# Patient Record
Sex: FEMALE | Race: WHITE | NOT HISPANIC OR LATINO | URBAN - METROPOLITAN AREA
[De-identification: names, ages, dates, MRNs, and addresses within clinical notes are randomized per-mention and may not be internally consistent; named-entity substitution may affect disease eponyms.]

---

## 2024-12-02 ENCOUNTER — NURSING HOME VISIT (OUTPATIENT)
Dept: POST ACUTE CARE | Facility: EXTERNAL LOCATION | Age: 69
End: 2024-12-02
Payer: MEDICARE

## 2024-12-02 DIAGNOSIS — I10 ESSENTIAL (PRIMARY) HYPERTENSION: Primary | ICD-10-CM

## 2024-12-02 DIAGNOSIS — R53.1 WEAKNESS: ICD-10-CM

## 2024-12-02 DIAGNOSIS — K21.9 GASTROESOPHAGEAL REFLUX DISEASE WITHOUT ESOPHAGITIS: ICD-10-CM

## 2024-12-02 DIAGNOSIS — S72.92XD UNSPECIFIED FRACTURE OF LEFT FEMUR, SUBSEQUENT ENCOUNTER FOR CLOSED FRACTURE WITH ROUTINE HEALING: ICD-10-CM

## 2024-12-02 PROCEDURE — 99305 1ST NF CARE MODERATE MDM 35: CPT | Performed by: INTERNAL MEDICINE

## 2024-12-02 NOTE — LETTER
Patient: Sujata Oliveros  : 1955    Encounter Date: 2024    HISTORY & PHYSICAL    Subjective  Chief complaint: Sujata Oliveros is a 69 y.o. female who is a acute skilled care patient being seen and evaluated for multiple medical problems.  Patient presents for weakness.    HPI:  Patient is a 69-year-old female who presented to the hospital after a fall.  Imaging showed a left proximal femoral diaphysis fracture around previous surgical hardware.  Orthopedic surgery was consulted.  She underwent ORIF and removal of hardware.  She was discharged to a SNF.        No past medical history on file.    No past surgical history on file.    No family history on file.    Social History     Socioeconomic History   • Marital status:        Vital signs:  133/82, 98.9, 83, 16, , 99%    Objective  Physical Exam  Constitutional:       General: She is not in acute distress.  Eyes:      Extraocular Movements: Extraocular movements intact.   Pulmonary:      Effort: Pulmonary effort is normal.   Musculoskeletal:      Cervical back: Neck supple.   Neurological:      Mental Status: She is alert.   Psychiatric:         Mood and Affect: Mood normal.         Behavior: Behavior is cooperative.         Assessment/Plan  Problem List Items Addressed This Visit       Unspecified fracture of left femur, subsequent encounter for closed fracture with routine healing     Status post ORIF  Pain control  PT OT         Essential (primary) hypertension - Primary     Continue current medications         Weakness     PT OT         Gastroesophageal reflux disease without esophagitis     Continue current medications          Hospital records reviewed  Medications, treatments, and labs reviewed  Continue medications and treatments as listed in EMR  Discussed with nursing and therapy    DO Dana Pettit Attestation  I, Chantell Cortez   attest that this documentation has been prepared under the direction and in  the presence of Juanjo Geronimo DO    Provider Attestation - Scribe documentation  All medical record entries made by the Scribe were at my direction and personally dictated by me. I have reviewed the chart and agree that the record accurately reflects my personal performance of the history, physical exam, discussion and plan.      Electronically Signed By: Juanjo Geronimo DO   12/15/24  3:59 PM

## 2024-12-03 ENCOUNTER — NURSING HOME VISIT (OUTPATIENT)
Dept: POST ACUTE CARE | Facility: EXTERNAL LOCATION | Age: 69
End: 2024-12-03
Payer: MEDICARE

## 2024-12-03 DIAGNOSIS — J45.909 UNCOMPLICATED ASTHMA, UNSPECIFIED ASTHMA SEVERITY, UNSPECIFIED WHETHER PERSISTENT (HHS-HCC): ICD-10-CM

## 2024-12-03 DIAGNOSIS — I10 ESSENTIAL (PRIMARY) HYPERTENSION: ICD-10-CM

## 2024-12-03 DIAGNOSIS — S72.92XD UNSPECIFIED FRACTURE OF LEFT FEMUR, SUBSEQUENT ENCOUNTER FOR CLOSED FRACTURE WITH ROUTINE HEALING: ICD-10-CM

## 2024-12-03 DIAGNOSIS — R53.1 WEAKNESS: Primary | ICD-10-CM

## 2024-12-03 PROCEDURE — 99309 SBSQ NF CARE MODERATE MDM 30: CPT | Performed by: REGISTERED NURSE

## 2024-12-03 NOTE — LETTER
Patient: Sujata Oliveros  : 1955    Encounter Date: 2024    PROGRESS NOTE    Subjective  Chief complaint: Sujata Oliveros is a 69 y.o. female who is an acute skilled patient being seen and evaluated for weakness    HPI:  Patient presents for f/u therapy and general medical care. Patient seen and examined at [facility]. Therapy has been working with the patient to improve strength, endurance, ADLs, and transfers d/t generalized weakness. Patient has no acute concerns today.    Objective  Vital signs: 112/56, 98.8, 86, 16, 100%    Physical Exam  Constitutional:       General: She is not in acute distress.  Eyes:      Extraocular Movements: Extraocular movements intact.   Pulmonary:      Effort: Pulmonary effort is normal.   Musculoskeletal:      Cervical back: Neck supple.   Neurological:      Mental Status: She is alert.   Psychiatric:         Mood and Affect: Mood normal.         Behavior: Behavior is cooperative.         Assessment/Plan  Problem List Items Addressed This Visit       Unspecified fracture of left femur, subsequent encounter for closed fracture with routine healing     Pain mgmt   Follow up ortho  Therapy            Essential (primary) hypertension     Continue current medications          Weakness - Primary     Pt/ot   Encourage pt to ask for assistance          Unspecified asthma, uncomplicated (HHS-HCC)     Continue current medications           Medications, treatments, and labs reviewed  Continue medications and treatments as listed in [facility]    Scribe Attestation  I, Dana Sosa   attest that this documentation has been prepared under the direction and in the presence of PIPO Wolff.    Provider Attestation - Scribe documentation  All medical record entries made by the Scribe were at my direction and personally dictated by me. I have reviewed the chart and agree that the record accurately reflects my personal performance of the history, physical exam,  discussion and plan.    PIPO Wolff        Electronically Signed By: PIPO Wolff   12/10/24  4:08 PM

## 2024-12-04 ENCOUNTER — NURSING HOME VISIT (OUTPATIENT)
Dept: POST ACUTE CARE | Facility: EXTERNAL LOCATION | Age: 69
End: 2024-12-04
Payer: MEDICARE

## 2024-12-04 DIAGNOSIS — I10 ESSENTIAL (PRIMARY) HYPERTENSION: ICD-10-CM

## 2024-12-04 DIAGNOSIS — R53.1 WEAKNESS: ICD-10-CM

## 2024-12-04 DIAGNOSIS — S72.92XD UNSPECIFIED FRACTURE OF LEFT FEMUR, SUBSEQUENT ENCOUNTER FOR CLOSED FRACTURE WITH ROUTINE HEALING: Primary | ICD-10-CM

## 2024-12-04 DIAGNOSIS — J45.909 UNCOMPLICATED ASTHMA, UNSPECIFIED ASTHMA SEVERITY, UNSPECIFIED WHETHER PERSISTENT (HHS-HCC): ICD-10-CM

## 2024-12-04 PROCEDURE — 99308 SBSQ NF CARE LOW MDM 20: CPT | Performed by: REGISTERED NURSE

## 2024-12-04 NOTE — LETTER
Patient: Sujata Oliveros  : 1955    Encounter Date: 2024    PROGRESS NOTE    Subjective  Chief complaint: Sujata Oliveros is a 69 y.o. female who is an acute skilled patient being seen and evaluated for weakness    HPI:  Patient in therapy d/t generalized weakness. Patient presents for f/u. Continues to work toward goals in therapy. No new complaints at this time.    Objective  Vital signs: 119/72, 97.6, 79, 16, 98%    Physical Exam  Constitutional:       General: She is not in acute distress.  Eyes:      Extraocular Movements: Extraocular movements intact.   Pulmonary:      Effort: Pulmonary effort is normal.   Musculoskeletal:      Cervical back: Neck supple.   Neurological:      Mental Status: She is alert.   Psychiatric:         Mood and Affect: Mood normal.         Behavior: Behavior is cooperative.         Assessment/Plan  Problem List Items Addressed This Visit       Unspecified fracture of left femur, subsequent encounter for closed fracture with routine healing - Primary     Pain mgmt   Follow up ortho  Therapy            Essential (primary) hypertension     Continue current medications          Weakness     Pt/ot   Encourage pt to ask for assistance          Unspecified asthma, uncomplicated (HHS-HCC)     Continue current medications           Medications, treatments, and labs reviewed  Continue medications and treatments as listed in [facility]    Scribe Attestation  INoemy Scribe   attest that this documentation has been prepared under the direction and in the presence of PIPO Wolff.    Provider Attestation - Scribe documentation  All medical record entries made by the Scribe were at my direction and personally dictated by me. I have reviewed the chart and agree that the record accurately reflects my personal performance of the history, physical exam, discussion and plan.    PIPO Wolff        Electronically Signed By: Juanjo Bradley  JONATHAN-CNP   12/11/24  7:25 PM

## 2024-12-04 NOTE — PROGRESS NOTES
PROGRESS NOTE    Subjective   Chief complaint: Sujata Oliveros is a 69 y.o. female who is an acute skilled patient being seen and evaluated for weakness    HPI:  Patient presents for f/u therapy and general medical care. Patient seen and examined at [facility]. Therapy has been working with the patient to improve strength, endurance, ADLs, and transfers d/t generalized weakness. Patient has no acute concerns today.    Objective   Vital signs: 112/56, 98.8, 86, 16, 100%    Physical Exam  Constitutional:       General: She is not in acute distress.  Eyes:      Extraocular Movements: Extraocular movements intact.   Pulmonary:      Effort: Pulmonary effort is normal.   Musculoskeletal:      Cervical back: Neck supple.   Neurological:      Mental Status: She is alert.   Psychiatric:         Mood and Affect: Mood normal.         Behavior: Behavior is cooperative.         Assessment/Plan   Problem List Items Addressed This Visit       Unspecified fracture of left femur, subsequent encounter for closed fracture with routine healing     Pain mgmt   Follow up ortho  Therapy            Essential (primary) hypertension     Continue current medications          Weakness - Primary     Pt/ot   Encourage pt to ask for assistance          Unspecified asthma, uncomplicated (HHS-HCC)     Continue current medications           Medications, treatments, and labs reviewed  Continue medications and treatments as listed in [facility]    Scribe Attestation  I, Dana Sosa   attest that this documentation has been prepared under the direction and in the presence of PIPO Wolff.    Provider Attestation - Scribe documentation  All medical record entries made by the Scribe were at my direction and personally dictated by me. I have reviewed the chart and agree that the record accurately reflects my personal performance of the history, physical exam, discussion and plan.    PIPO Wolff

## 2024-12-05 ENCOUNTER — NURSING HOME VISIT (OUTPATIENT)
Dept: POST ACUTE CARE | Facility: EXTERNAL LOCATION | Age: 69
End: 2024-12-05
Payer: MEDICARE

## 2024-12-05 DIAGNOSIS — S72.92XD UNSPECIFIED FRACTURE OF LEFT FEMUR, SUBSEQUENT ENCOUNTER FOR CLOSED FRACTURE WITH ROUTINE HEALING: ICD-10-CM

## 2024-12-05 DIAGNOSIS — I10 ESSENTIAL (PRIMARY) HYPERTENSION: Primary | ICD-10-CM

## 2024-12-05 DIAGNOSIS — K21.9 GASTROESOPHAGEAL REFLUX DISEASE WITHOUT ESOPHAGITIS: ICD-10-CM

## 2024-12-05 DIAGNOSIS — K59.01 SLOW TRANSIT CONSTIPATION: ICD-10-CM

## 2024-12-05 PROCEDURE — 99309 SBSQ NF CARE MODERATE MDM 30: CPT

## 2024-12-05 NOTE — LETTER
Patient: Sujata Oliveros  : 1955    Encounter Date: 2024    PROGRESS NOTE    Subjective  Chief complaint: Sujata Oliveros is a 69 y.o. female who is an acute skilled patient being seen and evaluated for weakness    HPI:  Patient in therapy d/t generalized weakness. Patient presents for f/u. Continues to work toward goals in therapy. No new complaints at this time.    24 Patient offers no complaints today.  Continues to work with therapy for mobility and strengthening.  Wound care continues to blister left thigh.  No F/C/N/V/D, SOB, cough.  No concerns per nursing          Objective  Vital signs: 119/72, 97.6, 79, 16, 98%    Physical Exam  Constitutional:       Appearance: Normal appearance.   HENT:      Head: Normocephalic.      Nose: Nose normal.      Mouth/Throat:      Mouth: Mucous membranes are moist.   Eyes:      Extraocular Movements: Extraocular movements intact.   Cardiovascular:      Rate and Rhythm: Normal rate and regular rhythm.      Pulses: Normal pulses.      Heart sounds: Normal heart sounds.   Pulmonary:      Effort: Pulmonary effort is normal.      Breath sounds: Normal breath sounds.   Abdominal:      General: Bowel sounds are normal.      Palpations: Abdomen is soft.   Musculoskeletal:         General: Normal range of motion.      Cervical back: Normal range of motion and neck supple.      Comments: Left femur fracture   Skin:     General: Skin is warm and dry.      Capillary Refill: Capillary refill takes less than 2 seconds.   Neurological:      Mental Status: She is alert and oriented to person, place, and time.   Psychiatric:         Mood and Affect: Mood normal.         Behavior: Behavior normal.         Assessment/Plan  Problem List Items Addressed This Visit       Unspecified fracture of left femur, subsequent encounter for closed fracture with routine healing      50% weightbearing   pain management   therapy         Essential (primary) hypertension - Primary      BP  within goal   monitor         Slow transit constipation     No complaints  Continue colace senna  MOM bisacodyl enema PRN         Gastroesophageal reflux disease without esophagitis     No complaints  Continue prilosec   monitor          Medications, treatments, and labs reviewed  Continue medications and treatments as listed in EMR    PIPO Mc      Electronically Signed By: PIPO Mc   12/12/24  2:12 PM

## 2024-12-05 NOTE — PROGRESS NOTES
PROGRESS NOTE    Subjective   Chief complaint: Sujata Oliveros is a 69 y.o. female who is an acute skilled patient being seen and evaluated for weakness    HPI:  Patient in therapy d/t generalized weakness. Patient presents for f/u. Continues to work toward goals in therapy. No new complaints at this time.    Objective   Vital signs: 119/72, 97.6, 79, 16, 98%    Physical Exam  Constitutional:       General: She is not in acute distress.  Eyes:      Extraocular Movements: Extraocular movements intact.   Pulmonary:      Effort: Pulmonary effort is normal.   Musculoskeletal:      Cervical back: Neck supple.   Neurological:      Mental Status: She is alert.   Psychiatric:         Mood and Affect: Mood normal.         Behavior: Behavior is cooperative.         Assessment/Plan   Problem List Items Addressed This Visit       Unspecified fracture of left femur, subsequent encounter for closed fracture with routine healing - Primary     Pain mgmt   Follow up ortho  Therapy            Essential (primary) hypertension     Continue current medications          Weakness     Pt/ot   Encourage pt to ask for assistance          Unspecified asthma, uncomplicated (HHS-HCC)     Continue current medications           Medications, treatments, and labs reviewed  Continue medications and treatments as listed in [facility]    Scribe Attestation  INoemy Scribe   attest that this documentation has been prepared under the direction and in the presence of PIPO Wolff.    Provider Attestation - Scribe documentation  All medical record entries made by the Scribe were at my direction and personally dictated by me. I have reviewed the chart and agree that the record accurately reflects my personal performance of the history, physical exam, discussion and plan.    PIPO Wolff

## 2024-12-05 NOTE — PROGRESS NOTES
HISTORY & PHYSICAL    Subjective   Chief complaint: Sujata Oliveros is a 69 y.o. female who is a acute skilled care patient being seen and evaluated for multiple medical problems.  Patient presents for weakness.    HPI:  Patient is a 69-year-old female who presented to the hospital after a fall.  Imaging showed a left proximal femoral diaphysis fracture around previous surgical hardware.  Orthopedic surgery was consulted.  She underwent ORIF and removal of hardware.  She was discharged to a SNF.        No past medical history on file.    No past surgical history on file.    No family history on file.    Social History     Socioeconomic History    Marital status:        Vital signs:  133/82, 98.9, 83, 16, , 99%    Objective   Physical Exam  Constitutional:       General: She is not in acute distress.  Eyes:      Extraocular Movements: Extraocular movements intact.   Pulmonary:      Effort: Pulmonary effort is normal.   Musculoskeletal:      Cervical back: Neck supple.   Neurological:      Mental Status: She is alert.   Psychiatric:         Mood and Affect: Mood normal.         Behavior: Behavior is cooperative.         Assessment/Plan   Problem List Items Addressed This Visit       Unspecified fracture of left femur, subsequent encounter for closed fracture with routine healing     Status post ORIF  Pain control  PT OT         Essential (primary) hypertension - Primary     Continue current medications         Weakness     PT OT         Gastroesophageal reflux disease without esophagitis     Continue current medications          Hospital records reviewed  Medications, treatments, and labs reviewed  Continue medications and treatments as listed in EMR  Discussed with nursing and therapy    DO Luis Alberto Pettitibbernardino Attestation  Chantell MON   attest that this documentation has been prepared under the direction and in the presence of Juanjo Geronimo DO    Provider Attestation - Dana  documentation  All medical record entries made by the Scribe were at my direction and personally dictated by me. I have reviewed the chart and agree that the record accurately reflects my personal performance of the history, physical exam, discussion and plan.

## 2024-12-09 ENCOUNTER — NURSING HOME VISIT (OUTPATIENT)
Dept: POST ACUTE CARE | Facility: EXTERNAL LOCATION | Age: 69
End: 2024-12-09
Payer: MEDICARE

## 2024-12-09 DIAGNOSIS — S72.92XD UNSPECIFIED FRACTURE OF LEFT FEMUR, SUBSEQUENT ENCOUNTER FOR CLOSED FRACTURE WITH ROUTINE HEALING: Primary | ICD-10-CM

## 2024-12-09 DIAGNOSIS — I10 ESSENTIAL (PRIMARY) HYPERTENSION: ICD-10-CM

## 2024-12-09 DIAGNOSIS — J45.909 UNCOMPLICATED ASTHMA, UNSPECIFIED ASTHMA SEVERITY, UNSPECIFIED WHETHER PERSISTENT (HHS-HCC): ICD-10-CM

## 2024-12-09 DIAGNOSIS — K21.9 GASTROESOPHAGEAL REFLUX DISEASE WITHOUT ESOPHAGITIS: ICD-10-CM

## 2024-12-09 PROCEDURE — 99308 SBSQ NF CARE LOW MDM 20: CPT | Performed by: REGISTERED NURSE

## 2024-12-09 NOTE — LETTER
Patient: Sujata Oliveros  : 1955    Encounter Date: 2024    PROGRESS NOTE    Subjective  Chief complaint: Sujata Oliveros is a 69 y.o. female who is an acute skilled patient being seen and evaluated for weakness    HPI:  Therapy has been working with the patient to improve strength and endurance with ADLs, transfers, and mobility. Patient continues to work toward goals. Patient is stable and has no new complaints. Nursing staff voices no new concerns today.    Objective  Vital signs: 120/80, 97.9, 88, 18, 97%    Physical Exam  Constitutional:       General: She is not in acute distress.  Eyes:      Extraocular Movements: Extraocular movements intact.   Pulmonary:      Effort: Pulmonary effort is normal.   Musculoskeletal:      Cervical back: Neck supple.   Neurological:      Mental Status: She is alert.   Psychiatric:         Mood and Affect: Mood normal.         Behavior: Behavior is cooperative.         Assessment/Plan  Problem List Items Addressed This Visit       Unspecified fracture of left femur, subsequent encounter for closed fracture with routine healing - Primary     Status post ORIF  Pain control  PT OT            Essential (primary) hypertension     Continue current medications          Unspecified asthma, uncomplicated (HHS-HCC)     Continue current medications          Gastroesophageal reflux disease without esophagitis     Continue current medications          Medications, treatments, and labs reviewed  Continue medications and treatments as listed in [facility]    Scribe Attestation  INoemy Scribe   attest that this documentation has been prepared under the direction and in the presence of PIPO Wolff.    Provider Attestation - Scribe documentation  All medical record entries made by the Scribe were at my direction and personally dictated by me. I have reviewed the chart and agree that the record accurately reflects my personal performance of the history,  physical exam, discussion and plan.    PIPO Wolff        Electronically Signed By: PIPO Wolff   12/16/24  4:57 PM

## 2024-12-10 ENCOUNTER — NURSING HOME VISIT (OUTPATIENT)
Dept: POST ACUTE CARE | Facility: EXTERNAL LOCATION | Age: 69
End: 2024-12-10
Payer: MEDICARE

## 2024-12-10 DIAGNOSIS — R53.1 WEAKNESS: ICD-10-CM

## 2024-12-10 DIAGNOSIS — I10 ESSENTIAL (PRIMARY) HYPERTENSION: Primary | ICD-10-CM

## 2024-12-10 DIAGNOSIS — S72.92XD UNSPECIFIED FRACTURE OF LEFT FEMUR, SUBSEQUENT ENCOUNTER FOR CLOSED FRACTURE WITH ROUTINE HEALING: ICD-10-CM

## 2024-12-10 PROBLEM — J45.909 UNSPECIFIED ASTHMA, UNCOMPLICATED (HHS-HCC): Status: ACTIVE | Noted: 2024-12-10

## 2024-12-10 PROCEDURE — 99308 SBSQ NF CARE LOW MDM 20: CPT | Performed by: REGISTERED NURSE

## 2024-12-10 NOTE — LETTER
Patient: Sujata Oliveros  : 1955    Encounter Date: 12/10/2024    PROGRESS NOTE    Subjective  Chief complaint: Sujata Oliveros is a 69 y.o. female who is an acute skilled patient being seen and evaluated for weakness    HPI:  Patient seen and examined at bedside. Patient denies n/v/f/c. Continues working in therapy. No new complaints.    Objective  Vital signs: 122/78, 97.6, 82, 18, 99%    Physical Exam  Constitutional:       General: She is not in acute distress.  Eyes:      Extraocular Movements: Extraocular movements intact.   Pulmonary:      Effort: Pulmonary effort is normal.   Musculoskeletal:      Cervical back: Neck supple.   Neurological:      Mental Status: She is alert.   Psychiatric:         Mood and Affect: Mood normal.         Behavior: Behavior is cooperative.         Assessment/Plan  Problem List Items Addressed This Visit       Unspecified fracture of left femur, subsequent encounter for closed fracture with routine healing     Status post ORIF  Pain control  PT OT            Essential (primary) hypertension - Primary     Continue current medications          Weakness     Pt/ot   Encourage pt to ask for assistance           Medications, treatments, and labs reviewed  Continue medications and treatments as listed in [facility]    Scribe Attestation  I, Dana Sosa   attest that this documentation has been prepared under the direction and in the presence of PIPO Wolff.    Provider Attestation - Scribe documentation  All medical record entries made by the Scribe were at my direction and personally dictated by me. I have reviewed the chart and agree that the record accurately reflects my personal performance of the history, physical exam, discussion and plan.    PIPO Wolff        Electronically Signed By: PIPO Wolff   24  8:25 PM

## 2024-12-10 NOTE — PROGRESS NOTES
PROGRESS NOTE    Subjective   Chief complaint: Sujata Oliveros is a 69 y.o. female who is an acute skilled patient being seen and evaluated for weakness    HPI:  Therapy has been working with the patient to improve strength and endurance with ADLs, transfers, and mobility. Patient continues to work toward goals. Patient is stable and has no new complaints. Nursing staff voices no new concerns today.    Objective   Vital signs: 120/80, 97.9, 88, 18, 97%    Physical Exam  Constitutional:       General: She is not in acute distress.  Eyes:      Extraocular Movements: Extraocular movements intact.   Pulmonary:      Effort: Pulmonary effort is normal.   Musculoskeletal:      Cervical back: Neck supple.   Neurological:      Mental Status: She is alert.   Psychiatric:         Mood and Affect: Mood normal.         Behavior: Behavior is cooperative.         Assessment/Plan   Problem List Items Addressed This Visit       Unspecified fracture of left femur, subsequent encounter for closed fracture with routine healing - Primary     Status post ORIF  Pain control  PT OT            Essential (primary) hypertension     Continue current medications          Unspecified asthma, uncomplicated (HHS-HCC)     Continue current medications          Gastroesophageal reflux disease without esophagitis     Continue current medications          Medications, treatments, and labs reviewed  Continue medications and treatments as listed in [facility]    Scribe Attestation  I, Dana Sosa   attest that this documentation has been prepared under the direction and in the presence of JONATHAN Wolff-CNP.    Provider Attestation - Scribe documentation  All medical record entries made by the Scribe were at my direction and personally dictated by me. I have reviewed the chart and agree that the record accurately reflects my personal performance of the history, physical exam, discussion and plan.    Juanjo Bradley,  Pt stable at time of discharge  Vital signs with in normal limits  Aware that they should have a follow up with their physician with in 24 hours  Copy of discharge instructions given to pt  APRN-CNP

## 2024-12-11 NOTE — PROGRESS NOTES
PROGRESS NOTE    Subjective   Chief complaint: Sujata Oliveros is a 69 y.o. female who is an acute skilled patient being seen and evaluated for weakness    HPI:  Patient seen and examined at bedside. Patient denies n/v/f/c. Continues working in therapy. No new complaints.    Objective   Vital signs: 122/78, 97.6, 82, 18, 99%    Physical Exam  Constitutional:       General: She is not in acute distress.  Eyes:      Extraocular Movements: Extraocular movements intact.   Pulmonary:      Effort: Pulmonary effort is normal.   Musculoskeletal:      Cervical back: Neck supple.   Neurological:      Mental Status: She is alert.   Psychiatric:         Mood and Affect: Mood normal.         Behavior: Behavior is cooperative.         Assessment/Plan   Problem List Items Addressed This Visit       Unspecified fracture of left femur, subsequent encounter for closed fracture with routine healing     Status post ORIF  Pain control  PT OT            Essential (primary) hypertension - Primary     Continue current medications          Weakness     Pt/ot   Encourage pt to ask for assistance           Medications, treatments, and labs reviewed  Continue medications and treatments as listed in [facility]    Scribe Attestation  INoemy Scribe   attest that this documentation has been prepared under the direction and in the presence of PIPO Wolff.    Provider Attestation - Scribe documentation  All medical record entries made by the Scribe were at my direction and personally dictated by me. I have reviewed the chart and agree that the record accurately reflects my personal performance of the history, physical exam, discussion and plan.    PIPO Wolff

## 2024-12-12 PROBLEM — K59.01 SLOW TRANSIT CONSTIPATION: Status: ACTIVE | Noted: 2024-12-12

## 2024-12-12 PROBLEM — K21.9 GASTROESOPHAGEAL REFLUX DISEASE WITHOUT ESOPHAGITIS: Status: ACTIVE | Noted: 2024-12-12

## 2024-12-12 NOTE — ASSESSMENT & PLAN NOTE
Continue current medications   
Continue current medications   
Pain mgmt   Follow up ortho  Therapy     
Pt/ot   Encourage pt to ask for assistance   
GENERAL: no acute distress; well-developed  HEAD:  +hematoma to frontal aspect of forehead  EYES: PERRLA, conjunctiva and sclera clear  ENT: MMM; oropharynx clear  NECK: Supple, No JVD  CHEST/LUNG: Clear to auscultation bilaterally; No wheeze  HEART: Regular rate and rhythm; No murmurs, rubs, or gallops  ABDOMEN: Soft, Nontender, Nondistended; Bowel sounds present  EXTREMITIES:  2+ Peripheral Pulses +TTP with edema and ecchymosis of dorsum of left hand. ROM limited by pain.   PSYCH: AAOx3  NEUROLOGY: no focal motor or sensory deficits. 5/5 muscle strength in all extremities.   SKIN: No rashes or lesions

## 2024-12-12 NOTE — PROGRESS NOTES
PROGRESS NOTE    Subjective   Chief complaint: Sujata Oliveros is a 69 y.o. female who is an acute skilled patient being seen and evaluated for weakness    HPI:  Patient in therapy d/t generalized weakness. Patient presents for f/u. Continues to work toward goals in therapy. No new complaints at this time.    12/5/24 Patient offers no complaints today.  Continues to work with therapy for mobility and strengthening.  Wound care continues to blister left thigh.  No F/C/N/V/D, SOB, cough.  No concerns per nursing          Objective   Vital signs: 119/72, 97.6, 79, 16, 98%    Physical Exam  Constitutional:       Appearance: Normal appearance.   HENT:      Head: Normocephalic.      Nose: Nose normal.      Mouth/Throat:      Mouth: Mucous membranes are moist.   Eyes:      Extraocular Movements: Extraocular movements intact.   Cardiovascular:      Rate and Rhythm: Normal rate and regular rhythm.      Pulses: Normal pulses.      Heart sounds: Normal heart sounds.   Pulmonary:      Effort: Pulmonary effort is normal.      Breath sounds: Normal breath sounds.   Abdominal:      General: Bowel sounds are normal.      Palpations: Abdomen is soft.   Musculoskeletal:         General: Normal range of motion.      Cervical back: Normal range of motion and neck supple.      Comments: Left femur fracture   Skin:     General: Skin is warm and dry.      Capillary Refill: Capillary refill takes less than 2 seconds.   Neurological:      Mental Status: She is alert and oriented to person, place, and time.   Psychiatric:         Mood and Affect: Mood normal.         Behavior: Behavior normal.         Assessment/Plan   Problem List Items Addressed This Visit       Unspecified fracture of left femur, subsequent encounter for closed fracture with routine healing      50% weightbearing   pain management   therapy         Essential (primary) hypertension - Primary      BP within goal   monitor         Slow transit constipation     No  complaints  Continue colace senna  MOM bisacodyl enema PRN         Gastroesophageal reflux disease without esophagitis     No complaints  Continue prilosec   monitor          Medications, treatments, and labs reviewed  Continue medications and treatments as listed in EMR    Tamera Pagan, APRN-CNP

## 2025-06-02 LAB
NON-UH HIE A/G RATIO: 1.2
NON-UH HIE ALB: 3.8 G/DL (ref 3.4–5)
NON-UH HIE ALK PHOS: 72 UNIT/L (ref 45–117)
NON-UH HIE BILIRUBIN, TOTAL: 0.4 MG/DL (ref 0.3–1.2)
NON-UH HIE BUN/CREAT RATIO: 21.2
NON-UH HIE BUN: 17 MG/DL (ref 9–23)
NON-UH HIE CALCIUM, IONIZED: 1.11 MMOL/L (ref 1.12–1.32)
NON-UH HIE CALCIUM: 9.2 MG/DL (ref 8.7–10.4)
NON-UH HIE CALCULATED OSMOLALITY: 279 MOSM/KG (ref 275–295)
NON-UH HIE CHLORIDE: 105 MMOL/L (ref 98–107)
NON-UH HIE CO2, VENOUS: 27 MMOL/L (ref 20–31)
NON-UH HIE CREATININE: 0.8 MG/DL (ref 0.5–0.8)
NON-UH HIE GFR AA: >60
NON-UH HIE GLOBULIN: 3.2 G/DL
NON-UH HIE GLOMERULAR FILTRATION RATE: >60 ML/MIN/1.73M?
NON-UH HIE GLUCOSE: 95 MG/DL (ref 74–106)
NON-UH HIE GOT: 18 UNIT/L (ref 15–37)
NON-UH HIE GPT: 17 UNIT/L (ref 10–49)
NON-UH HIE I-PTH: 36 PG/ML (ref 18.4–80.1)
NON-UH HIE K: 3.9 MMOL/L (ref 3.5–5.1)
NON-UH HIE NA: 139 MMOL/L (ref 135–145)
NON-UH HIE TOTAL PROTEIN: 7 G/DL (ref 5.7–8.2)
NON-UH HIE TSH: 0.05 UIU/ML (ref 0.55–4.78)
NON-UH HIE VIT D 25: 98 NG/ML

## 2025-06-05 LAB — NON-UH HIE THYROGLOBULIN: NORMAL

## 2025-08-14 LAB
NON-UH HIE FREE T4: 2.06 NG/DL (ref 0.89–1.76)
NON-UH HIE TSH: 0.14 UIU/ML (ref 0.55–4.78)